# Patient Record
Sex: MALE | URBAN - METROPOLITAN AREA
[De-identification: names, ages, dates, MRNs, and addresses within clinical notes are randomized per-mention and may not be internally consistent; named-entity substitution may affect disease eponyms.]

---

## 2022-02-11 ENCOUNTER — TELEPHONE (OUTPATIENT)
Dept: PRIMARY CARE CLINIC | Age: 32
End: 2022-02-11

## 2022-02-11 NOTE — TELEPHONE ENCOUNTER
----- Message from Ángela Jessica sent at 2022  8:49 AM EST -----  Subject: Appointment Request    Reason for Call: New Patient Request Appointment    QUESTIONS  Type of Appointment? New Patient/New to Provider  Reason for appointment request? Other - Patient no in state. Additional Information for Provider? Patient would like to establish care   as new patient. Patient also thinks he may have shingles. Patient went to   urgent care earlier this week for headaches. Patient has developed rash   since then and swollen lymph nodes in neck. Patient would like a virtual   appointment but is currently in [address] [place] [address].   ---------------------------------------------------------------------------  --------------  CALL BACK INFO  What is the best way for the office to contact you? OK to leave message on   voicemail  Preferred Call Back Phone Number? [phone]  ---------------------------------------------------------------------------  --------------  SCRIPT ANSWERS  Relationship to Patient? Self  Specialty Confirmation? Primary Care  Is this the first appointment to establish care for a ? No  Have you been diagnosed with, awaiting test results for, or told that you   are suspected of having COVID-19 (Coronavirus)? (If patient has tested   negative or was tested as a requirement for work, school, or travel and   not based on symptoms, answer no)? No  Within the past two weeks have you developed any of the following symptoms   (answer no if symptoms have been present longer than 2 weeks or began   more than 2 weeks ago)? Fever or Chills, Cough, Shortness of breath or   difficulty breathing, Loss of taste or smell, Sore throat, Nasal   congestion, Sneezing or runny nose, Fatigue or generalized body aches   (answer no if pain is specific to a body part e.g. back pain), Diarrhea,   Headache?  Yes

## 2022-02-11 NOTE — TELEPHONE ENCOUNTER
Called and spoke to patient and informed him that at this time we are unable to schedule him for a VV, explained that he would need to be in the state of [place] for visit. Advised patient to go to Urgent Care to be evaluated for possible singles.

## 2024-04-28 ENCOUNTER — APPOINTMENT (OUTPATIENT)
Dept: GENERAL RADIOLOGY | Age: 34
End: 2024-04-28
Payer: COMMERCIAL

## 2024-04-28 ENCOUNTER — HOSPITAL ENCOUNTER (INPATIENT)
Age: 34
LOS: 3 days | Discharge: HOME | End: 2024-05-01
Attending: EMERGENCY MEDICINE | Admitting: INTERNAL MEDICINE
Payer: COMMERCIAL

## 2024-04-28 DIAGNOSIS — R55 SYNCOPE, UNSPECIFIED SYNCOPE TYPE: ICD-10-CM

## 2024-04-28 DIAGNOSIS — E86.0 DEHYDRATION: Primary | ICD-10-CM

## 2024-04-28 DIAGNOSIS — R55 SYNCOPE WITH NORMAL NEUROLOGIC EXAMINATION: ICD-10-CM

## 2024-04-28 DIAGNOSIS — N17.9 AKI (ACUTE KIDNEY INJURY) (HCC): ICD-10-CM

## 2024-04-28 LAB
ABSOLUTE BANDS: 1.09 K/UL (ref 0–1)
ANION GAP SERPL CALCULATED.3IONS-SCNC: 16 MMOL/L (ref 9–17)
BACTERIA URNS QL MICRO: ABNORMAL
BANDS: 5 % (ref 0–10)
BASOPHILS # BLD: 0 K/UL (ref 0–0.2)
BASOPHILS NFR BLD: 0 % (ref 0–2)
BILIRUB UR QL STRIP: NEGATIVE
BUN SERPL-MCNC: 20 MG/DL (ref 6–20)
CALCIUM SERPL-MCNC: 9 MG/DL (ref 8.6–10.4)
CASTS #/AREA URNS LPF: ABNORMAL /LPF
CHLORIDE SERPL-SCNC: 100 MMOL/L (ref 98–107)
CK SERPL-CCNC: 546 U/L (ref 39–308)
CLARITY UR: ABNORMAL
CO2 SERPL-SCNC: 23 MMOL/L (ref 20–31)
COLOR UR: YELLOW
CREAT SERPL-MCNC: 2.3 MG/DL (ref 0.7–1.2)
CREAT UR-MCNC: 118 MG/DL (ref 39–259)
EOSINOPHIL # BLD: 0 K/UL (ref 0–0.4)
EOSINOPHIL,URINE: NEGATIVE
EOSINOPHILS RELATIVE PERCENT: 0 % (ref 0–4)
EPI CELLS #/AREA URNS HPF: ABNORMAL /HPF
ERYTHROCYTE [DISTWIDTH] IN BLOOD BY AUTOMATED COUNT: 13.3 % (ref 11.5–14.9)
GFR SERPL CREATININE-BSD FRML MDRD: 37 ML/MIN/1.73M2
GLUCOSE SERPL-MCNC: 140 MG/DL (ref 70–99)
GLUCOSE UR STRIP-MCNC: NEGATIVE MG/DL
HCT VFR BLD AUTO: 44.4 % (ref 41–53)
HGB BLD-MCNC: 14.2 G/DL (ref 13.5–17.5)
HGB UR QL STRIP.AUTO: NEGATIVE
INR PPP: 1.1
KETONES UR STRIP-MCNC: ABNORMAL MG/DL
LEUKOCYTE ESTERASE UR QL STRIP: NEGATIVE
LYMPHOCYTES NFR BLD: 1.53 K/UL (ref 1–4.8)
LYMPHOCYTES RELATIVE PERCENT: 7 % (ref 24–44)
MAGNESIUM SERPL-MCNC: 1.9 MG/DL (ref 1.6–2.6)
MCH RBC QN AUTO: 30.1 PG (ref 26–34)
MCHC RBC AUTO-ENTMCNC: 32 G/DL (ref 31–37)
MCV RBC AUTO: 94.2 FL (ref 80–100)
MONOCYTES NFR BLD: 0.44 K/UL (ref 0.1–1.3)
MONOCYTES NFR BLD: 2 % (ref 1–7)
MORPHOLOGY: NORMAL
NEUTROPHILS NFR BLD: 86 % (ref 36–66)
NEUTS SEG NFR BLD: 18.74 K/UL (ref 1.3–9.1)
NITRITE UR QL STRIP: NEGATIVE
PARTIAL THROMBOPLASTIN TIME: 26.4 SEC (ref 24–36)
PH UR STRIP: 5 [PH] (ref 5–8)
PHOSPHATE SERPL-MCNC: 3 MG/DL (ref 2.5–4.5)
PLATELET # BLD AUTO: 173 K/UL (ref 150–450)
PMV BLD AUTO: 8.6 FL (ref 6–12)
POTASSIUM SERPL-SCNC: 4.7 MMOL/L (ref 3.7–5.3)
PROT UR STRIP-MCNC: ABNORMAL MG/DL
PROTHROMBIN TIME: 14.4 SEC (ref 11.8–14.6)
RBC # BLD AUTO: 4.71 M/UL (ref 4.5–5.9)
RBC #/AREA URNS HPF: ABNORMAL /HPF
SODIUM SERPL-SCNC: 139 MMOL/L (ref 135–144)
SODIUM UR-SCNC: 30 MMOL/L
SP GR UR STRIP: 1.02 (ref 1–1.03)
TROPONIN I SERPL HS-MCNC: 37 NG/L (ref 0–22)
TROPONIN I SERPL HS-MCNC: 72 NG/L (ref 0–22)
TROPONIN I SERPL HS-MCNC: 86 NG/L (ref 0–22)
UROBILINOGEN UR STRIP-ACNC: NORMAL EU/DL (ref 0–1)
UUN UR-MCNC: 665 MG/DL
WBC #/AREA URNS HPF: ABNORMAL /HPF
WBC OTHER # BLD: 21.8 K/UL (ref 3.5–11)

## 2024-04-28 PROCEDURE — 99285 EMERGENCY DEPT VISIT HI MDM: CPT

## 2024-04-28 PROCEDURE — 6360000002 HC RX W HCPCS: Performed by: INTERNAL MEDICINE

## 2024-04-28 PROCEDURE — 85610 PROTHROMBIN TIME: CPT

## 2024-04-28 PROCEDURE — 87086 URINE CULTURE/COLONY COUNT: CPT

## 2024-04-28 PROCEDURE — 84484 ASSAY OF TROPONIN QUANT: CPT

## 2024-04-28 PROCEDURE — 83735 ASSAY OF MAGNESIUM: CPT

## 2024-04-28 PROCEDURE — 84300 ASSAY OF URINE SODIUM: CPT

## 2024-04-28 PROCEDURE — 81001 URINALYSIS AUTO W/SCOPE: CPT

## 2024-04-28 PROCEDURE — 84540 ASSAY OF URINE/UREA-N: CPT

## 2024-04-28 PROCEDURE — 2580000003 HC RX 258: Performed by: INTERNAL MEDICINE

## 2024-04-28 PROCEDURE — 85730 THROMBOPLASTIN TIME PARTIAL: CPT

## 2024-04-28 PROCEDURE — 96360 HYDRATION IV INFUSION INIT: CPT

## 2024-04-28 PROCEDURE — 84100 ASSAY OF PHOSPHORUS: CPT

## 2024-04-28 PROCEDURE — 2580000003 HC RX 258: Performed by: EMERGENCY MEDICINE

## 2024-04-28 PROCEDURE — 80048 BASIC METABOLIC PNL TOTAL CA: CPT

## 2024-04-28 PROCEDURE — 96361 HYDRATE IV INFUSION ADD-ON: CPT

## 2024-04-28 PROCEDURE — 71045 X-RAY EXAM CHEST 1 VIEW: CPT

## 2024-04-28 PROCEDURE — 82550 ASSAY OF CK (CPK): CPT

## 2024-04-28 PROCEDURE — 2060000000 HC ICU INTERMEDIATE R&B

## 2024-04-28 PROCEDURE — 36415 COLL VENOUS BLD VENIPUNCTURE: CPT

## 2024-04-28 PROCEDURE — 93005 ELECTROCARDIOGRAM TRACING: CPT | Performed by: EMERGENCY MEDICINE

## 2024-04-28 PROCEDURE — 85025 COMPLETE CBC W/AUTO DIFF WBC: CPT

## 2024-04-28 PROCEDURE — 87205 SMEAR GRAM STAIN: CPT

## 2024-04-28 PROCEDURE — 82570 ASSAY OF URINE CREATININE: CPT

## 2024-04-28 RX ORDER — ONDANSETRON 2 MG/ML
4 INJECTION INTRAMUSCULAR; INTRAVENOUS EVERY 6 HOURS PRN
Status: DISCONTINUED | OUTPATIENT
Start: 2024-04-28 | End: 2024-05-01 | Stop reason: HOSPADM

## 2024-04-28 RX ORDER — ACETAMINOPHEN 650 MG/1
650 SUPPOSITORY RECTAL EVERY 6 HOURS PRN
Status: DISCONTINUED | OUTPATIENT
Start: 2024-04-28 | End: 2024-05-01 | Stop reason: HOSPADM

## 2024-04-28 RX ORDER — POLYETHYLENE GLYCOL 3350 17 G/17G
17 POWDER, FOR SOLUTION ORAL DAILY PRN
Status: DISCONTINUED | OUTPATIENT
Start: 2024-04-28 | End: 2024-05-01 | Stop reason: HOSPADM

## 2024-04-28 RX ORDER — SODIUM CHLORIDE 9 MG/ML
INJECTION, SOLUTION INTRAVENOUS CONTINUOUS
Status: DISCONTINUED | OUTPATIENT
Start: 2024-04-28 | End: 2024-04-30

## 2024-04-28 RX ORDER — ONDANSETRON 4 MG/1
4 TABLET, ORALLY DISINTEGRATING ORAL EVERY 8 HOURS PRN
Status: DISCONTINUED | OUTPATIENT
Start: 2024-04-28 | End: 2024-05-01 | Stop reason: HOSPADM

## 2024-04-28 RX ORDER — SODIUM CHLORIDE 9 MG/ML
INJECTION, SOLUTION INTRAVENOUS PRN
Status: DISCONTINUED | OUTPATIENT
Start: 2024-04-28 | End: 2024-05-01 | Stop reason: HOSPADM

## 2024-04-28 RX ORDER — POTASSIUM CHLORIDE 7.45 MG/ML
10 INJECTION INTRAVENOUS PRN
Status: DISCONTINUED | OUTPATIENT
Start: 2024-04-28 | End: 2024-05-01 | Stop reason: HOSPADM

## 2024-04-28 RX ORDER — LANOLIN ALCOHOL/MO/W.PET/CERES
3 CREAM (GRAM) TOPICAL NIGHTLY PRN
Status: DISCONTINUED | OUTPATIENT
Start: 2024-04-28 | End: 2024-05-01 | Stop reason: HOSPADM

## 2024-04-28 RX ORDER — SODIUM CHLORIDE 0.9 % (FLUSH) 0.9 %
5-40 SYRINGE (ML) INJECTION PRN
Status: DISCONTINUED | OUTPATIENT
Start: 2024-04-28 | End: 2024-05-01 | Stop reason: HOSPADM

## 2024-04-28 RX ORDER — 0.9 % SODIUM CHLORIDE 0.9 %
1000 INTRAVENOUS SOLUTION INTRAVENOUS ONCE
Status: COMPLETED | OUTPATIENT
Start: 2024-04-28 | End: 2024-04-28

## 2024-04-28 RX ORDER — ACETAMINOPHEN 325 MG/1
650 TABLET ORAL EVERY 6 HOURS PRN
Status: DISCONTINUED | OUTPATIENT
Start: 2024-04-28 | End: 2024-05-01 | Stop reason: HOSPADM

## 2024-04-28 RX ORDER — POTASSIUM CHLORIDE 20 MEQ/1
40 TABLET, EXTENDED RELEASE ORAL PRN
Status: DISCONTINUED | OUTPATIENT
Start: 2024-04-28 | End: 2024-05-01 | Stop reason: HOSPADM

## 2024-04-28 RX ORDER — SODIUM CHLORIDE 0.9 % (FLUSH) 0.9 %
5-40 SYRINGE (ML) INJECTION EVERY 12 HOURS SCHEDULED
Status: DISCONTINUED | OUTPATIENT
Start: 2024-04-28 | End: 2024-05-01 | Stop reason: HOSPADM

## 2024-04-28 RX ORDER — PHENOL 1.4 %
5 AEROSOL, SPRAY (ML) MUCOUS MEMBRANE NIGHTLY PRN
COMMUNITY

## 2024-04-28 RX ORDER — MAGNESIUM SULFATE HEPTAHYDRATE 40 MG/ML
2000 INJECTION, SOLUTION INTRAVENOUS PRN
Status: DISCONTINUED | OUTPATIENT
Start: 2024-04-28 | End: 2024-05-01 | Stop reason: HOSPADM

## 2024-04-28 RX ORDER — ENOXAPARIN SODIUM 100 MG/ML
40 INJECTION SUBCUTANEOUS DAILY
Status: DISCONTINUED | OUTPATIENT
Start: 2024-04-28 | End: 2024-05-01 | Stop reason: HOSPADM

## 2024-04-28 RX ADMIN — SODIUM CHLORIDE 1000 ML: 9 INJECTION, SOLUTION INTRAVENOUS at 14:39

## 2024-04-28 RX ADMIN — SODIUM CHLORIDE 1000 ML: 9 INJECTION, SOLUTION INTRAVENOUS at 13:41

## 2024-04-28 RX ADMIN — CEFTRIAXONE SODIUM 1000 MG: 1 INJECTION, POWDER, FOR SOLUTION INTRAMUSCULAR; INTRAVENOUS at 19:55

## 2024-04-28 RX ADMIN — SODIUM CHLORIDE: 9 INJECTION, SOLUTION INTRAVENOUS at 16:01

## 2024-04-28 ASSESSMENT — HEART SCORE: ECG: NORMAL

## 2024-04-28 ASSESSMENT — PAIN - FUNCTIONAL ASSESSMENT: PAIN_FUNCTIONAL_ASSESSMENT: NONE - DENIES PAIN

## 2024-04-28 NOTE — H&P
[facility]  IN-PATIENT SERVICE  [facility]  IN-PATIENT SERVICE   [facility]     HISTORY AND PHYSICAL EXAMINATION            Date:   4/28/2024  Patient name:  Irwin Elena  Date of admission:  4/28/2024  1:29 PM  MRN:   [id number]  Account:  [id number]  YOB: 1990  PCP:    No primary care provider on file.  Room:   08/08  Code Status:    No Order    Chief Complaint:     Chief Complaint   Patient presents with    Loss of Consciousness       History Obtained From:     patient    History of Present Illness:     Irwin Elena is a 33 y.o. male without significant past medical history who presented after syncopal episode at home.  Patient was participating in [place] this morning, and after finishing, the patient experienced blurry vision, ears ringing, and had an episode of emesis.  About 1 hour after finishing, the patient was at home speaking with his sister when he slumped over and lost consciousness for approximately 15 seconds.  Patient regained consciousness without confusion.  There is no witnessed shaking, tongue bite, trauma, or incontinence.  Patient had associated muscle cramps with running, and 2-day history of diarrhea. Patient denies any cough, congestion, headache, runny nose, or other signs of infection besides diarrhea and abdominal cramps. In the ED, EKG showed ST elevations in V2 to V5.  Troponin elevated 86, repeat 72.  Cardiology was consulted and did not recommend any acute interventions at this time.  Lab workup also notable for leukocytosis of 21.8, and EDEL with Cr of 2.3, baseline normal.       Past Medical History:     History reviewed. No pertinent past medical history.     Past SurgicalHistory:     History reviewed. No pertinent surgical history.     Medications Prior to Admission:     Prior to Admission medications    Not on File        Allergies:     Patient has          Investigations:     Laboratory Testing:  Recent Results (from the past 24 hour(s))   Basic Metabolic Panel    Collection Time: 04/28/24  2:15 PM   Result Value Ref Range    Sodium 139 135 - 144 mmol/L    Potassium 4.7 3.7 - 5.3 mmol/L    Chloride 100 98 - 107 mmol/L    CO2 23 20 - 31 mmol/L    Anion Gap 16 9 - 17 mmol/L    Glucose 140 (H) 70 - 99 mg/dL    BUN 20 6 - 20 mg/dL    Creatinine 2.3 (H) 0.7 - 1.2 mg/dL    Est, Glom Filt Rate 37 (L) >60 mL/min/1.73m2    Calcium 9.0 8.6 - 10.4 mg/dL   CBC with Auto Differential    Collection Time: 04/28/24  2:15 PM   Result Value Ref Range    WBC 21.8 (H) 3.5 - 11.0 k/uL    RBC 4.71 4.5 - 5.9 m/uL    Hemoglobin 14.2 13.5 - 17.5 g/dL    Hematocrit 44.4 41 - 53 %    MCV 94.2 80 - 100 fL    MCH 30.1 26 - 34 pg    MCHC 32.0 31 - 37 g/dL    RDW 13.3 11.5 - 14.9 %    Platelets 173 150 - 450 k/uL    MPV 8.6 6.0 - 12.0 fL    Neutrophils % 86 (H) 36 - 66 %    Lymphocytes % 7 (L) 24 - 44 %    Monocytes % 2 1 - 7 %    Eosinophils % 0 0 - 4 %    Basophils % 0 0 - 2 %    Bands 5 0 - 10 %    Neutrophils Absolute 18.74 (H) 1.3 - 9.1 k/uL    Lymphocytes Absolute 1.53 1.0 - 4.8 k/uL    Monocytes Absolute 0.44 0.1 - 1.3 k/uL    Eosinophils Absolute 0.00 0.0 - 0.4 k/uL    Basophils Absolute 0.00 0.0 - 0.2 k/uL    Absolute Bands 1.09 (H) 0.0 - 1.0 k/uL    Morphology Normal    CK    Collection Time: 04/28/24  2:15 PM   Result Value Ref Range    Total  (H) 39 - 308 U/L   Magnesium    Collection Time: 04/28/24  2:15 PM   Result Value Ref Range    Magnesium 1.9 1.6 - 2.6 mg/dL   Phosphorus    Collection Time: 04/28/24  2:15 PM   Result Value Ref Range    Phosphorus 3.0 2.5 - 4.5 mg/dL   Protime-INR    Collection Time: 04/28/24  2:15 PM   Result Value Ref Range    Protime 14.4 11.8 - 14.6 sec    INR 1.1    APTT    Collection Time: 04/28/24  2:15 PM   Result Value Ref Range    APTT 26.4 24.0 - 36.0 sec   Troponin    Collection Time: 04/28/24  2:15 PM   Result Value Ref Range

## 2024-04-28 NOTE — ED TRIAGE NOTES
Mode of arrival (squad #, walk in, police, etc) : Medic 41        Chief complaint(s): Loss of Consciousness        Arrival Note (brief scenario, treatment PTA, etc).: pt ran [place] today and felt woozy at end of race. Pt got home and passed out. Pt has never has LOC, pt did not hit head. Pt reports he thought he was adequately hydrating. Pt receiving IV fluids from EMS upon arrival        C= \"Have you ever felt that you should Cut down on your drinking?\"  No  A= \"Have people Annoyed you by criticizing your drinking?\"  No  G= \"Have you ever felt bad or Guilty about your drinking?\"  No  E= \"Have you ever had a drink as an Eye-opener first thing in the morning to steady your nerves or to help a hangover?\"  No      Deferred []      Reason for deferring: N/A    *If yes to two or more: probable alcohol abuse.*

## 2024-04-28 NOTE — ED PROVIDER NOTES
EMERGENCY DEPARTMENT ENCOUNTER    Pt Name: Irwin Elena  MRN: [id number]  Birthdate 1990  Date of evaluation: 4/28/24  CHIEF COMPLAINT       Chief Complaint   Patient presents with    Loss of Consciousness     HISTORY OF PRESENT ILLNESS   33-year-old male presenting to the ER complaining of a syncopal event.  Patient ran a 26.2 mile marathon and felt a little weak afterwards but went home and then had a syncopal event.  The patient feels fatigued currently but no other symptoms    The history is provided by the patient.   Loss of Consciousness  Episode history:  Single  Most recent episode:  Today  Associated symptoms: no chest pain, no dizziness, no fever, no nausea, no palpitations, no shortness of breath and no vomiting            REVIEW OF SYSTEMS     Review of Systems   Constitutional:  Positive for fatigue. Negative for activity change and fever.   HENT:  Negative for congestion, ear pain and trouble swallowing.    Eyes:  Negative for photophobia and visual disturbance.   Respiratory:  Negative for cough and shortness of breath.    Cardiovascular:  Positive for syncope. Negative for chest pain and palpitations.   Gastrointestinal:  Negative for abdominal pain, diarrhea, nausea and vomiting.   Genitourinary:  Negative for dysuria, flank pain and urgency.   Musculoskeletal:  Negative for arthralgias and myalgias.   Skin:  Negative for color change and rash.   Neurological:  Positive for syncope. Negative for dizziness and facial asymmetry.   Psychiatric/Behavioral:  Negative for agitation and behavioral problems.      PASTMEDICAL HISTORY   History reviewed. No pertinent past medical history.  Past Problem List  Patient Active Problem List   Diagnosis Code    Syncope with normal neurologic examination R55     SURGICAL HISTORY     History reviewed. No pertinent surgical history.  CURRENT MEDICATIONS       Current Discharge Medication List        CONTINUE these medications which have NOT CHANGED    Details

## 2024-04-28 NOTE — ED NOTES
Report given to TOMMY Carranza from U.   Report method by phone   The following was reviewed with receiving RN:   Current vital signs:  /76   Pulse 82   Temp 97.8 °F (36.6 °C) (Oral)   Resp 12   Ht 1.778 m (5' 10\")   Wt 79.4 kg (175 lb)   SpO2 99%   BMI 25.11 kg/m²                MEWS Score: 0     Any medication or safety alerts were reviewed. Any pending diagnostics and notifications were also reviewed, as well as any safety concerns or issues, abnormal labs, abnormal imaging, and abnormal assessment findings. Questions were answered.

## 2024-04-29 ENCOUNTER — APPOINTMENT (OUTPATIENT)
Dept: ULTRASOUND IMAGING | Age: 34
End: 2024-04-29
Payer: COMMERCIAL

## 2024-04-29 ENCOUNTER — HOSPITAL ENCOUNTER (INPATIENT)
Age: 34
Discharge: HOME | End: 2024-05-01
Attending: INTERNAL MEDICINE
Payer: COMMERCIAL

## 2024-04-29 PROBLEM — E86.0 DEHYDRATION: Status: ACTIVE | Noted: 2024-04-29

## 2024-04-29 PROBLEM — R79.89 TROPONIN LEVEL ELEVATED: Status: ACTIVE | Noted: 2024-04-29

## 2024-04-29 LAB
ANION GAP SERPL CALCULATED.3IONS-SCNC: 8 MMOL/L (ref 9–17)
BASOPHILS # BLD: 0 K/UL (ref 0–0.2)
BASOPHILS NFR BLD: 0 % (ref 0–2)
BUN SERPL-MCNC: 14 MG/DL (ref 6–20)
CALCIUM SERPL-MCNC: 8.2 MG/DL (ref 8.6–10.4)
CHLORIDE SERPL-SCNC: 109 MMOL/L (ref 98–107)
CHLORIDE UR-SCNC: 171 MMOL/L
CK SERPL-CCNC: 3770 U/L (ref 39–308)
CO2 SERPL-SCNC: 23 MMOL/L (ref 20–31)
CREAT SERPL-MCNC: 1.1 MG/DL (ref 0.7–1.2)
ECHO AO ROOT DIAM: 3.5 CM
ECHO AO ROOT INDEX: 1.78 CM/M2
ECHO AV AREA PEAK VELOCITY: 3.4 CM2
ECHO AV AREA VTI: 3 CM2
ECHO AV AREA/BSA PEAK VELOCITY: 1.7 CM2/M2
ECHO AV AREA/BSA VTI: 1.5 CM2/M2
ECHO AV MEAN GRADIENT: 5 MMHG
ECHO AV MEAN VELOCITY: 1.1 M/S
ECHO AV PEAK GRADIENT: 8 MMHG
ECHO AV PEAK VELOCITY: 1.4 M/S
ECHO AV VELOCITY RATIO: 1
ECHO AV VTI: 30.3 CM
ECHO BSA: 1.98 M2
ECHO LA AREA 2C: 19.4 CM2
ECHO LA AREA 4C: 16.7 CM2
ECHO LA DIAMETER INDEX: 1.47 CM/M2
ECHO LA DIAMETER: 2.9 CM
ECHO LA MAJOR AXIS: 5.1 CM
ECHO LA MINOR AXIS: 4.3 CM
ECHO LA TO AORTIC ROOT RATIO: 0.83
ECHO LA VOL BP: 60 ML (ref 18–58)
ECHO LA VOL MOD A2C: 68 ML (ref 18–58)
ECHO LA VOL MOD A4C: 44 ML (ref 18–58)
ECHO LA VOL/BSA BIPLANE: 30 ML/M2 (ref 16–34)
ECHO LA VOLUME INDEX MOD A2C: 35 ML/M2 (ref 16–34)
ECHO LA VOLUME INDEX MOD A4C: 22 ML/M2 (ref 16–34)
ECHO LV FRACTIONAL SHORTENING: 28 % (ref 28–44)
ECHO LV INTERNAL DIMENSION DIASTOLE INDEX: 2.54 CM/M2
ECHO LV INTERNAL DIMENSION DIASTOLIC: 5 CM (ref 4.2–5.9)
ECHO LV INTERNAL DIMENSION SYSTOLIC INDEX: 1.83 CM/M2
ECHO LV INTERNAL DIMENSION SYSTOLIC: 3.6 CM
ECHO LV IVSD: 0.8 CM (ref 0.6–1)
ECHO LV MASS 2D: 146.8 G (ref 88–224)
ECHO LV MASS INDEX 2D: 74.5 G/M2 (ref 49–115)
ECHO LV POSTERIOR WALL DIASTOLIC: 0.9 CM (ref 0.6–1)
ECHO LV RELATIVE WALL THICKNESS RATIO: 0.36
ECHO LVOT AREA: 3.5 CM2
ECHO LVOT AV VTI INDEX: 0.87
ECHO LVOT DIAM: 2.1 CM
ECHO LVOT MEAN GRADIENT: 3 MMHG
ECHO LVOT PEAK GRADIENT: 8 MMHG
ECHO LVOT PEAK VELOCITY: 1.4 M/S
ECHO LVOT STROKE VOLUME INDEX: 46.6 ML/M2
ECHO LVOT SV: 91.7 ML
ECHO LVOT VTI: 26.5 CM
ECHO MV A VELOCITY: 0.65 M/S
ECHO MV E DECELERATION TIME (DT): 155 MS
ECHO MV E VELOCITY: 0.9 M/S
ECHO MV E/A RATIO: 1.38
ECHO RA AREA 4C: 24.8 CM2
ECHO RA END SYSTOLIC VOLUME APICAL 4 CHAMBER INDEX BSA: 42 ML/M2
ECHO RA VOLUME: 83 ML
ECHO RV TAPSE: 2.8 CM (ref 1.7–?)
EOSINOPHIL # BLD: 0.16 K/UL (ref 0–0.4)
EOSINOPHILS RELATIVE PERCENT: 1 % (ref 0–4)
ERYTHROCYTE [DISTWIDTH] IN BLOOD BY AUTOMATED COUNT: 13.3 % (ref 11.5–14.9)
GFR SERPL CREATININE-BSD FRML MDRD: 90 ML/MIN/1.73M2
GLUCOSE SERPL-MCNC: 103 MG/DL (ref 70–99)
HCT VFR BLD AUTO: 39.2 % (ref 41–53)
HGB BLD-MCNC: 13.1 G/DL (ref 13.5–17.5)
LYMPHOCYTES NFR BLD: 2.74 K/UL (ref 1–4.8)
LYMPHOCYTES RELATIVE PERCENT: 17 % (ref 24–44)
MCH RBC QN AUTO: 31.7 PG (ref 26–34)
MCHC RBC AUTO-ENTMCNC: 33.3 G/DL (ref 31–37)
MCV RBC AUTO: 95.2 FL (ref 80–100)
MONOCYTES NFR BLD: 0.81 K/UL (ref 0.1–1.3)
MONOCYTES NFR BLD: 5 % (ref 1–7)
MORPHOLOGY: NORMAL
NEUTROPHILS NFR BLD: 77 % (ref 36–66)
NEUTS SEG NFR BLD: 12.39 K/UL (ref 1.3–9.1)
PLATELET # BLD AUTO: 152 K/UL (ref 150–450)
PMV BLD AUTO: 9.1 FL (ref 6–12)
POTASSIUM SERPL-SCNC: 5 MMOL/L (ref 3.7–5.3)
RBC # BLD AUTO: 4.12 M/UL (ref 4.5–5.9)
SODIUM SERPL-SCNC: 140 MMOL/L (ref 135–144)
SODIUM UR-SCNC: 157 MMOL/L
TOTAL PROTEIN, URINE: 9 MG/DL
WBC OTHER # BLD: 16.1 K/UL (ref 3.5–11)

## 2024-04-29 PROCEDURE — 99223 1ST HOSP IP/OBS HIGH 75: CPT | Performed by: INTERNAL MEDICINE

## 2024-04-29 PROCEDURE — 36415 COLL VENOUS BLD VENIPUNCTURE: CPT

## 2024-04-29 PROCEDURE — 84156 ASSAY OF PROTEIN URINE: CPT

## 2024-04-29 PROCEDURE — 51798 US URINE CAPACITY MEASURE: CPT

## 2024-04-29 PROCEDURE — 82550 ASSAY OF CK (CPK): CPT

## 2024-04-29 PROCEDURE — 82436 ASSAY OF URINE CHLORIDE: CPT

## 2024-04-29 PROCEDURE — 86225 DNA ANTIBODY NATIVE: CPT

## 2024-04-29 PROCEDURE — 76775 US EXAM ABDO BACK WALL LIM: CPT

## 2024-04-29 PROCEDURE — 84300 ASSAY OF URINE SODIUM: CPT

## 2024-04-29 PROCEDURE — 2580000003 HC RX 258: Performed by: INTERNAL MEDICINE

## 2024-04-29 PROCEDURE — 93005 ELECTROCARDIOGRAM TRACING: CPT

## 2024-04-29 PROCEDURE — 2060000000 HC ICU INTERMEDIATE R&B

## 2024-04-29 PROCEDURE — 85025 COMPLETE CBC W/AUTO DIFF WBC: CPT

## 2024-04-29 PROCEDURE — 93306 TTE W/DOPPLER COMPLETE: CPT

## 2024-04-29 PROCEDURE — 80048 BASIC METABOLIC PNL TOTAL CA: CPT

## 2024-04-29 PROCEDURE — 93306 TTE W/DOPPLER COMPLETE: CPT | Performed by: INTERNAL MEDICINE

## 2024-04-29 PROCEDURE — 6360000002 HC RX W HCPCS

## 2024-04-29 PROCEDURE — 86038 ANTINUCLEAR ANTIBODIES: CPT

## 2024-04-29 RX ADMIN — SODIUM CHLORIDE: 9 INJECTION, SOLUTION INTRAVENOUS at 23:38

## 2024-04-29 RX ADMIN — ENOXAPARIN SODIUM 40 MG: 100 INJECTION SUBCUTANEOUS at 08:01

## 2024-04-29 RX ADMIN — SODIUM CHLORIDE: 9 INJECTION, SOLUTION INTRAVENOUS at 15:58

## 2024-04-29 ASSESSMENT — PAIN SCALES - GENERAL: PAINLEVEL_OUTOF10: 2

## 2024-04-29 ASSESSMENT — PAIN - FUNCTIONAL ASSESSMENT: PAIN_FUNCTIONAL_ASSESSMENT: ACTIVITIES ARE NOT PREVENTED

## 2024-04-29 ASSESSMENT — PAIN DESCRIPTION - PAIN TYPE: TYPE: ACUTE PAIN

## 2024-04-29 ASSESSMENT — PAIN DESCRIPTION - LOCATION: LOCATION: GENERALIZED

## 2024-04-29 ASSESSMENT — PAIN DESCRIPTION - DESCRIPTORS: DESCRIPTORS: SORE

## 2024-04-29 NOTE — CONSULTS
Department of Internal Medicine  Nephrology Cherelle Martinez MD   Consult Note    Reason for consultation: Management of acute kidney injury.    Consulting physician: Taryn Velaqzuez MD.    History of present illness: This is a 33 y.o. male with no significant past medical history, who presented after episode of syncope which occurred after he finished a 26.2 mile marathon.  There was no associated weakness orseizures.  He is not on any medications. He apparently had diarrhea few days prior to the marathon. He however stayed hydrated and was drinking Gatorade.    Vital signs were stable at presentation with blood pressure 130/64 mmHg.  Chest x-ray was clear.  Laboratory studies were remarkable for elevated serum creatinine 2.3 mg/dL and  and hence nephrology consultation.  CPK level has jumped to 3770 today.  He is nonoliguric.    Patient has no known allergies.    History reviewed. No pertinent past medical history.    Scheduled Meds:   sodium chloride flush  5-40 mL IntraVENous 2 times per day    enoxaparin  40 mg SubCUTAneous Daily     Continuous Infusions:   sodium chloride 150 mL/hr at 04/28/24 1955    sodium chloride       PRN Meds:.sodium chloride flush, sodium chloride, potassium chloride **OR** potassium alternative oral replacement **OR** potassium chloride, magnesium sulfate, ondansetron **OR** ondansetron, melatonin, polyethylene glycol, acetaminophen **OR** acetaminophen    History reviewed. No pertinent family history.     Social History     Socioeconomic History    Marital status: Single     Spouse name: None    Number of children: None    Years of education: None    Highest education level: None   Tobacco Use    Smoking status: Never   Substance and Sexual Activity    Alcohol use: Yes     Comment: weekly    Drug use: Never     Social Determinants of Health     Food Insecurity: No Food Insecurity (4/28/2024)    Hunger Vital Sign     Worried About Running Out of Food in the Last Year:

## 2024-04-29 NOTE — DISCHARGE INSTR - COC
Continuity of Care Form    Patient Name: Irwin Elena   :  1990  MRN:  [id number]    Admit date:  2024  Discharge date:  ***    Code Status Order: Full Code   Advance Directives:     Admitting Physician:  Taryn Velazquez MD  PCP: No primary care provider on file.    Discharging Nurse: ***  Discharging Hospital Unit/Room#: 2083/2083-01  Discharging Unit Phone Number: ***    Emergency Contact:   No emergency contact information on file.    Past Surgical History:  History reviewed. No pertinent surgical history.    Immunization History:     There is no immunization history on file for this patient.    Active Problems:  Patient Active Problem List   Diagnosis Code    Syncope with normal neurologic examination R55       Isolation/Infection:   Isolation            No Isolation          Patient Infection Status       None to display            Nurse Assessment:  Last Vital Signs: /78   Pulse 70   Temp 97.5 °F (36.4 °C) (Oral)   Resp 18   Ht 1.778 m (5' 10\")   Wt 79.4 kg (175 lb)   SpO2 99%   BMI 25.11 kg/m²     Last documented pain score (0-10 scale): Pain Level: 2  Last Weight:   Wt Readings from Last 1 Encounters:   24 79.4 kg (175 lb)     Mental Status:  {IP PT MENTAL STATUS:}    IV Access:  { HILDA IV ACCESS:073296258}    Nursing Mobility/ADLs:  Walking   {CHP DME ADLs:999944938}  Transfer  {CHP DME ADLs:396306102}  Bathing  {CHP DME ADLs:075019299}  Dressing  {CHP DME ADLs:912395777}  Toileting  {CHP DME ADLs:350516234}  Feeding  {CHP DME ADLs:709824808}  Med Admin  {CHP DME ADLs:181081050}  Med Delivery   { HILDA MED Delivery:484473727}    Wound Care Documentation and Therapy:        Elimination:  Continence:   Bowel: {YES / NO:}  Bladder: {YES / NO:}  Urinary Catheter: {Urinary Catheter:044374191}   Colostomy/Ileostomy/Ileal Conduit: {YES / NO:}       Date of Last BM: ***  No intake or output data in the 24 hours ending 24 1602  No intake/output data  30 days.     Update Admission H&P: {CHP DME Changes in HandP:390646372}    PHYSICIAN SIGNATURE:  {Esignature:604319111}

## 2024-04-29 NOTE — PROGRESS NOTES
[facility]   OCCUPATIONAL THERAPY MISSED TREATMENT NOTE   INPATIENT   Date: 24  Patient Name: Irwin Elena       Room:   MRN: [id number]   Account #: [id number]    : 1990  (33 y.o.)  Gender: male                 REASON FOR MISSED TREATMENT:  Patient screened for occupational therapy this date    -   Writer introduced self and role of OT. Patient is independent in-room with self-care and functional mobility. OT being discontinued with no acute OT needs. Case discussed with TOMMY Yarbrough. 0901    Electronically signed by GARY Kerr on 24 at 9:25 AM EDT

## 2024-04-29 NOTE — CONSULTS
[facility]   Consult Note                 Date:   4/29/2024  Date of admission:  4/28/2024  1:29 PM  MRN:   [id number]  YOB: 1990    Reason for Consult:   syncope    HISTORY OF PRESENT ILLNESS:    The patient is a 33 y.o.  male who is admitted to the hospital for loss of consciousness.    According patient he then first-time marathon although he did has been practicing for marathon.    According to had a history of some diarrhea abdominal discomfort  According to the last 1 or 2 mi patient was very difficult heavy legs.    Patient finish the marathon went home according to has been drinking Gatorade patient is a loss of consciousness   Denies any prodromal symptoms of heart fluttering racing skipping   Patient 1st tropes 86- 72- 37  - 3770   EKG shows some early repolarization changes patient creatinine was 2.3 on arrival this morning is 1.1  Denies any prior history of syncope any cardiac problem hypertension diabetes DVT patient is nonsmoker    Past Medical History:   has no past medical history on file.    Past Surgical History:   has no past surgical history on file.     Home Medications:    Prior to Admission medications    Medication Sig Start Date End Date Taking? Authorizing Provider   Melatonin 10 MG TABS Take 5 mg by mouth nightly as needed (takes about once a month)   Yes Provider, MD Chinmay       Current Medications: Scheduled Meds:   sodium chloride flush  5-40 mL IntraVENous 2 times per day    enoxaparin  40 mg SubCUTAneous Daily    cefTRIAXone (ROCEPHIN) IV  1,000 mg IntraVENous Q24H     Continuous Infusions:   sodium chloride 150 mL/hr at 04/28/24 1955    sodium chloride       PRN Meds:.sodium chloride flush, sodium chloride, potassium chloride **OR** potassium alternative oral replacement **OR** potassium chloride, magnesium sulfate, ondansetron **OR** ondansetron, melatonin, polyethylene glycol, acetaminophen **OR** acetaminophen     Allergies:

## 2024-04-29 NOTE — DISCHARGE INSTR - DIET
Good nutrition is important when healing from an illness, injury, or surgery.  Follow any nutrition recommendations given to you during your hospital stay.   If you were given an oral nutrition supplement while in the hospital, continue to take this supplement at home.  You can take it with meals, in-between meals, and/or before bedtime. These supplements can be purchased at most local grocery stores, pharmacies, and chain [employer]-stores.   If you have any questions about your diet or nutrition, call the hospital and ask for the dietitian.    Healthy balanced nutrition

## 2024-04-29 NOTE — PROGRESS NOTES
[facility]  IN-PATIENT SERVICE  [facility]    PROGRESS NOTE             4/29/2024    7:55 AM    Name:   Irwin Elena  MRN:     [id number]     Acct:      [id number]   Room:   2083/2083-01   Day:  1  Admit Date:  4/28/2024  1:29 PM    PCP:  No primary care provider on file.  Code Status:  Full Code    Subjective:     C/C:   Chief Complaint   Patient presents with    Loss of Consciousness     Interval History Status: significantly improved.    Patient seen and examined at bedside this morning.  He was resting comfortably in bed.  States he feels much better than when he came in.  Denies chest pain, shortness of breath, flank pain, dysuria, or cough.    Nephro following.  CK up to 3000 this morning from 500.  Likely continue to monitor overnight for improvement of CK.    Kidney function back to near baseline.    Brief History:     Irwin Elena is a 33 y.o. male without significant past medical history who presented after syncopal episode at home.  Patient was participating in [employer] this morning, and after finishing, the patient experienced blurry vision, ears ringing, and had an episode of emesis.  About 1 hour after finishing, the patient was at home speaking with his sister when he slumped over and lost consciousness for approximately 15 seconds.  Patient regained consciousness without confusion.  There is no witnessed shaking, tongue bite, trauma, or incontinence.  Patient had associated muscle cramps with running, and 2-day history of diarrhea. Patient denies any cough, congestion, headache, runny nose, or other signs of infection besides diarrhea and abdominal cramps. In the ED, EKG showed ST elevations in V2 to V5.  Troponin elevated 86, repeat 72.  Cardiology was consulted and did not recommend any acute interventions at this time.  Lab workup also notable for leukocytosis of 21.8, and EDEL with Cr of 2.3, baseline normal.     Review of Systems:      Review of Systems    Medications:     Allergies:  No Known Allergies    Current Meds:   Scheduled Meds:    sodium chloride flush  5-40 mL IntraVENous 2 times per day    enoxaparin  40 mg SubCUTAneous Daily    cefTRIAXone (ROCEPHIN) IV  1,000 mg IntraVENous Q24H     Continuous Infusions:    sodium chloride 150 mL/hr at 24    sodium chloride       PRN Meds: sodium chloride flush, sodium chloride, potassium chloride **OR** potassium alternative oral replacement **OR** potassium chloride, magnesium sulfate, ondansetron **OR** ondansetron, melatonin, polyethylene glycol, acetaminophen **OR** acetaminophen    Data:     Past Medical History:   has no past medical history on file.    Social History:   reports that he has never smoked. He does not have any smokeless tobacco history on file. He reports current alcohol use. He reports that he does not use drugs.     Family History: History reviewed. No pertinent family history.    Vitals:  /74   Pulse 66   Temp 97.5 °F (36.4 °C) (Oral)   Resp 18   Ht 1.778 m (5' 10\")   Wt 79.4 kg (175 lb)   SpO2 100%   BMI 25.11 kg/m²   Temp (24hrs), Av.2 °F (36.8 °C), Min:97.5 °F (36.4 °C), Max:99.2 °F (37.3 °C)    No results for input(s): \"POCGLU\" in the last 72 hours.    I/O(24Hr):  No intake or output data in the 24 hours ending 24 0755    Labs:  [unfilled]    No results found for: \"SPECIAL\"  No results found for: \"CULTURE\"    [unfilled]    Radiology:    XR CHEST PORTABLE    Result Date: 2024  EXAMINATION: ONE XRAY VIEW OF THE CHEST 2024 12:35 pm COMPARISON: None. HISTORY: ORDERING SYSTEM PROVIDED HISTORY: syncope TECHNOLOGIST PROVIDED HISTORY: syncope Reason for Exam: Pt states he was running a marathon today and his leg started feeling weird and he passed out after the race. No chest complaints FINDINGS: The lungs and costophrenic angles are clear.  The cardiomediastinal silhouette, pulmonary vessels and interstitium appear normal.     No

## 2024-04-29 NOTE — CARE COORDINATION
Case Management Assessment  Initial Evaluation    Date/Time of Evaluation: 4/29/2024 9:29 AM  Assessment Completed by: Carleen Schumacher RN    If patient is discharged prior to next notation, then this note serves as note for discharge by case management.    Patient Name: Irwin Elena                   YOB: 1990  Diagnosis: Dehydration [E86.0]  EDEL (acute kidney injury) (HCC) [N17.9]  Syncope with normal neurologic examination [R55]                   Date / Time: 4/28/2024  1:29 PM    Patient Admission Status: Inpatient   Readmission Risk (Low < 19, Mod (19-27), High > 27): Readmission Risk Score: 4.2    Current PCP: No primary care provider on file.  PCP verified by CM? Yes    Chart Reviewed: Yes      History Provided by: Patient  Patient Orientation: Alert and Oriented    Patient Cognition: Alert    Hospitalization in the last 30 days (Readmission):  No    If yes, Readmission Assessment in CM Navigator will be completed.    Advance Directives:      Code Status: Full Code   Patient's Primary Decision Maker is:        Discharge Planning:    Patient lives with: Alone Type of Home: House  Primary Care Giver: Self  Patient Support Systems include: Parent   Current Financial resources: None  Current community resources: None  Current services prior to admission: None            Current DME:              Type of Home Care services:  None    ADLS  Prior functional level: Independent in ADLs/IADLs  Current functional level: Independent in ADLs/IADLs    PT AM-PAC:   /24  OT AM-PAC:   /24    Family can provide assistance at DC: Yes  Would you like Case Management to discuss the discharge plan with any other family members/significant others, and if so, who? No  Plans to Return to Present Housing: Yes  Other Identified Issues/Barriers to RETURNING to current housing: no  Potential Assistance needed at discharge: N/A            Potential DME:  n/a  Patient expects to discharge to: House  Plan for

## 2024-04-29 NOTE — PLAN OF CARE
Problem: Discharge Planning  Goal: Discharge to home or other facility with appropriate resources  4/29/2024 1345 by Linnea Espinoza, RN  Outcome: Progressing  Dc barrier: echo, renal US results and labs ordered in the morning to trend CR    Problem: Safety - Adult  Goal: Free from fall injury  4/29/2024 1345 by Linnea Espinoza, RN  Outcome: Progressing  Pt assessed as a fall risk this shift. Remains free from falls and accidental injury at this time. Fall precautions in place, including falling star sign and fall risk band on pt. Floor free from obstacles, and bed is locked and in lowest position. Adequate lighting provided.  Pt encouraged to call before getting OOB for any need.  Bed alarm activated. Will continue to monitor needs during hourly rounding, and reinforce education on use of call light.     Problem: Pain  Goal: Verbalizes/displays adequate comfort level or baseline comfort level  4/29/2024 1345 by Linnea Espinoza, RN  Outcome: Progressing   Pt medicated with pain medication prn.  Assessed all pain characteristics including level, type, location, frequency, and onset.  Non-pharmacologic interventions offered to pt as well.  Pt states pain is tolerable at this time.

## 2024-04-29 NOTE — PROGRESS NOTES
Physical Therapy        Physical Therapy Cancel Note      DATE: 2024    NAME: Irwin Elena  MRN: [id number]   : 1990      Patient not seen this date for Physical Therapy due to:    Patient at baseline functional level with no acute PT needs. Will defer PT evaluation at this time. Please reorder PT if future needs arise.       Electronically signed by Genesis Parkinson, PT on 2024 at 12:17 PM

## 2024-04-30 LAB
ANA SER QL IA: NEGATIVE
ANION GAP SERPL CALCULATED.3IONS-SCNC: 7 MMOL/L (ref 9–17)
BASOPHILS # BLD: 0 K/UL (ref 0–0.2)
BASOPHILS NFR BLD: 1 % (ref 0–2)
BUN SERPL-MCNC: 10 MG/DL (ref 6–20)
CALCIUM SERPL-MCNC: 8.9 MG/DL (ref 8.6–10.4)
CHLORIDE SERPL-SCNC: 110 MMOL/L (ref 98–107)
CK SERPL-CCNC: 4038 U/L (ref 39–308)
CO2 SERPL-SCNC: 24 MMOL/L (ref 20–31)
CREAT SERPL-MCNC: 1 MG/DL (ref 0.7–1.2)
DSDNA IGG SER QL IA: 1.2 IU/ML
EKG ATRIAL RATE: 71 BPM
EKG ATRIAL RATE: 74 BPM
EKG ATRIAL RATE: 78 BPM
EKG P AXIS: 71 DEGREES
EKG P AXIS: 73 DEGREES
EKG P AXIS: 78 DEGREES
EKG P-R INTERVAL: 170 MS
EKG P-R INTERVAL: 174 MS
EKG P-R INTERVAL: 176 MS
EKG Q-T INTERVAL: 400 MS
EKG Q-T INTERVAL: 406 MS
EKG Q-T INTERVAL: 422 MS
EKG QRS DURATION: 100 MS
EKG QRS DURATION: 102 MS
EKG QRS DURATION: 96 MS
EKG QTC CALCULATION (BAZETT): 441 MS
EKG QTC CALCULATION (BAZETT): 456 MS
EKG QTC CALCULATION (BAZETT): 468 MS
EKG R AXIS: 82 DEGREES
EKG R AXIS: 87 DEGREES
EKG R AXIS: 89 DEGREES
EKG T AXIS: 40 DEGREES
EKG T AXIS: 61 DEGREES
EKG T AXIS: 69 DEGREES
EKG VENTRICULAR RATE: 71 BPM
EKG VENTRICULAR RATE: 74 BPM
EKG VENTRICULAR RATE: 78 BPM
EOSINOPHIL # BLD: 0.1 K/UL (ref 0–0.4)
EOSINOPHILS RELATIVE PERCENT: 1 % (ref 0–4)
ERYTHROCYTE [DISTWIDTH] IN BLOOD BY AUTOMATED COUNT: 13 % (ref 11.5–14.9)
GFR SERPL CREATININE-BSD FRML MDRD: >90 ML/MIN/1.73M2
GLUCOSE SERPL-MCNC: 98 MG/DL (ref 70–99)
HCT VFR BLD AUTO: 39.2 % (ref 41–53)
HGB BLD-MCNC: 12.9 G/DL (ref 13.5–17.5)
LYMPHOCYTES NFR BLD: 2.7 K/UL (ref 1–4.8)
LYMPHOCYTES RELATIVE PERCENT: 35 % (ref 24–44)
MCH RBC QN AUTO: 31.3 PG (ref 26–34)
MCHC RBC AUTO-ENTMCNC: 32.8 G/DL (ref 31–37)
MCV RBC AUTO: 95.6 FL (ref 80–100)
MICROORGANISM SPEC CULT: NO GROWTH
MONOCYTES NFR BLD: 0.4 K/UL (ref 0.1–1.3)
MONOCYTES NFR BLD: 6 % (ref 1–7)
NEUTROPHILS NFR BLD: 57 % (ref 36–66)
NEUTS SEG NFR BLD: 4.5 K/UL (ref 1.3–9.1)
NUCLEAR IGG SER IA-RTO: 0.3 U/ML
PLATELET # BLD AUTO: 143 K/UL (ref 150–450)
PMV BLD AUTO: 9 FL (ref 6–12)
POTASSIUM SERPL-SCNC: 4.4 MMOL/L (ref 3.7–5.3)
RBC # BLD AUTO: 4.11 M/UL (ref 4.5–5.9)
SODIUM SERPL-SCNC: 141 MMOL/L (ref 135–144)
SPECIMEN DESCRIPTION: NORMAL
WBC OTHER # BLD: 7.8 K/UL (ref 3.5–11)

## 2024-04-30 PROCEDURE — 51798 US URINE CAPACITY MEASURE: CPT

## 2024-04-30 PROCEDURE — 85025 COMPLETE CBC W/AUTO DIFF WBC: CPT

## 2024-04-30 PROCEDURE — 80048 BASIC METABOLIC PNL TOTAL CA: CPT

## 2024-04-30 PROCEDURE — 6360000002 HC RX W HCPCS

## 2024-04-30 PROCEDURE — 99233 SBSQ HOSP IP/OBS HIGH 50: CPT | Performed by: SURGERY

## 2024-04-30 PROCEDURE — 2580000003 HC RX 258: Performed by: INTERNAL MEDICINE

## 2024-04-30 PROCEDURE — 93010 ELECTROCARDIOGRAM REPORT: CPT | Performed by: INTERNAL MEDICINE

## 2024-04-30 PROCEDURE — 36415 COLL VENOUS BLD VENIPUNCTURE: CPT

## 2024-04-30 PROCEDURE — 1200000000 HC SEMI PRIVATE

## 2024-04-30 PROCEDURE — 99232 SBSQ HOSP IP/OBS MODERATE 35: CPT | Performed by: INTERNAL MEDICINE

## 2024-04-30 PROCEDURE — 82550 ASSAY OF CK (CPK): CPT

## 2024-04-30 RX ORDER — SODIUM CHLORIDE 450 MG/100ML
INJECTION, SOLUTION INTRAVENOUS CONTINUOUS
Status: DISCONTINUED | OUTPATIENT
Start: 2024-04-30 | End: 2024-05-01 | Stop reason: HOSPADM

## 2024-04-30 RX ADMIN — SODIUM CHLORIDE: 4.5 INJECTION, SOLUTION INTRAVENOUS at 15:39

## 2024-04-30 RX ADMIN — ENOXAPARIN SODIUM 40 MG: 100 INJECTION SUBCUTANEOUS at 07:49

## 2024-04-30 RX ADMIN — SODIUM CHLORIDE: 9 INJECTION, SOLUTION INTRAVENOUS at 07:50

## 2024-04-30 ASSESSMENT — ENCOUNTER SYMPTOMS
WHEEZING: 0
VOMITING: 0
CHEST TIGHTNESS: 0
ABDOMINAL DISTENTION: 0
SHORTNESS OF BREATH: 0
DIARRHEA: 0
COUGH: 0
BLOOD IN STOOL: 0
STRIDOR: 0
CONSTIPATION: 0
NAUSEA: 0
ABDOMINAL PAIN: 0

## 2024-04-30 NOTE — PLAN OF CARE
Problem: Discharge Planning  Goal: Discharge to home or other facility with appropriate resources  4/30/2024 1304 by Linnea Espinoza, RN  Outcome: Progressing  Dc barrier: CK elevated, needing to plateau or trend down     Problem: Safety - Adult  Goal: Free from fall injury  4/30/2024 1304 by Linnea Espinoza, RN  Outcome: Progressing  Pt assessed as a fall risk this shift. Remains free from falls and accidental injury at this time. Fall precautions in place, including falling star sign and fall risk band on pt. Floor free from obstacles, and bed is locked and in lowest position. Adequate lighting provided.  Pt encouraged to call before getting OOB for any need.  Bed alarm activated. Will continue to monitor needs during hourly rounding, and reinforce education on use of call light.     Problem: Pain  Goal: Verbalizes/displays adequate comfort level or baseline comfort level  4/30/2024 1304 by Linnea Espinoza, RN  Outcome: Progressing  Pt medicated with pain medication prn.  Assessed all pain characteristics including level, type, location, frequency, and onset.  Non-pharmacologic interventions offered to pt as well.  Pt states pain is tolerable at this time.

## 2024-04-30 NOTE — PROGRESS NOTES
[facility]  Progress Note                   Date:   4/30/2024  Patient name: Irwin Elena  Date of admission:  4/28/2024  1:29 PM  MRN:   [id number]  YOB: 1990  PCP: No primary care provider on file.    Reason for Admission: Dehydration [E86.0]  EDEL (acute kidney injury) (HCC) [N17.9]  Syncope with normal neurologic examination [R55]    Subjective:       Clinical Changes /Abnormalities:Patient seen and examined. Denies chest pain or shortness of breath. Tele/vitals/labs reviewed .     Review of Systems    Medications:   Scheduled Meds:   sodium chloride flush  5-40 mL IntraVENous 2 times per day    enoxaparin  40 mg SubCUTAneous Daily     Continuous Infusions:   sodium chloride 125 mL/hr at 04/30/24 0750    sodium chloride       CBC:   Recent Labs     04/28/24  1415 04/29/24  0604 04/30/24  0554   WBC 21.8* 16.1* 7.8   HGB 14.2 13.1* 12.9*    152 143*     BMP:    Recent Labs     04/28/24  1415 04/29/24  0604 04/30/24  0554    140 141   K 4.7 5.0 4.4    109* 110*   CO2 23 23 24   BUN 20 14 10   CREATININE 2.3* 1.1 1.0   GLUCOSE 140* 103* 98     Hepatic:No results for input(s): \"AST\", \"ALT\", \"ALB\", \"BILITOT\", \"ALKPHOS\" in the last 72 hours.  Troponin:   Recent Labs     04/28/24  1415 04/28/24  1515 04/28/24  2032   TROPHS 86* 72* 37*     BNP: No results for input(s): \"BNP\" in the last 72 hours.  Lipids: No results for input(s): \"CHOL\", \"HDL\" in the last 72 hours.    Invalid input(s): \"LDLCALCU\"  INR:   Recent Labs     04/28/24  1415   INR 1.1     ECho Interpretation Summary 4/2024          Left Ventricle: Normal left ventricular systolic function with a visually estimated EF of 60 - 65%. Left ventricle size is normal. Normal wall thickness. Normal wall motion. Normal diastolic function.    No significant valvular regurgitation or stenosis seen on present study.     Echo Findings    Left Ventricle Normal left ventricular systolic function with a visually estimated

## 2024-04-30 NOTE — PROGRESS NOTES
Department of Internal Medicine  Nephrology Cherelle Martinez MD Progress Note    Reason for consultation: Management of acute kidney injury.    Consulting physician: Taryn Velazquez MD.    Interval history: Patient was seen and examined today and is nonoliguric.  He still has soreness in both legs but urine is kelby-colored.  CPK has not improved.  He does not have chest pain or shortness of breath.    History of present illness: This is a 33 y.o. male with no significant past medical history, who presented after episode of syncope which occurred after he finished a 26.2 mile marathon.  There was no associated weakness orseizures.  He is not on any medications. He apparently had diarrhea few days prior to the marathon. He however stayed hydrated and was drinking Gatorade.    Vital signs were stable at presentation with blood pressure 130/64 mmHg.  Chest x-ray was clear.  Laboratory studies were remarkable for elevated serum creatinine 2.3 mg/dL and  and hence nephrology consultation.  CPK level has jumped to 3770 today.  He is nonoliguric.    Scheduled Meds:   sodium chloride flush  5-40 mL IntraVENous 2 times per day    enoxaparin  40 mg SubCUTAneous Daily     Continuous Infusions:   sodium chloride 125 mL/hr at 24 0750    sodium chloride       PRN Meds:.sodium chloride flush, sodium chloride, potassium chloride **OR** potassium alternative oral replacement **OR** potassium chloride, magnesium sulfate, ondansetron **OR** ondansetron, melatonin, polyethylene glycol, acetaminophen **OR** acetaminophen    Physical Exam:    VITALS:  BP (!) 137/95   Pulse 55   Temp 97.4 °F (36.3 °C) (Oral)   Resp 17   Ht 1.778 m (5' 10\")   Wt 79.4 kg (175 lb)   SpO2 100%   BMI 25.11 kg/m²   TEMPERATURE:  Current - Temp: 97.4 °F (36.3 °C); Max - Temp  Av °F (36.7 °C)  Min: 97.4 °F (36.3 °C)  Max: 98.4 °F (36.9 °C)  RESPIRATIONS RANGE: Resp  Av.2  Min: 16  Max: 18  PULSE RANGE: Pulse  Av.2  Min:  10:18 PM     Urine Creatinine:     Lab Results   Component Value Date/Time    LABCREA 118.0 04/28/2024 08:55 PM     IMPRESSION/RECOMMENDATIONS:      1.  Acute kidney injury - top differentials are prerenal azotemia from dehydration as well as toxic acute tubular necrosis secondary to rhabdomyolysis.   Renal function has normalized.  There is no evidence at this point of ATN secondary to rhabdomyolysis but risk persists.  Plan:  Change IVF to 0.45 NS at 125 mL/h.  Check CPK daily.  Monitor urine output closely.  Basic metabolic profile daily    2.  Leukocytosis - likely reactive.  WBC is down to normal at 7.8K.    3.  Rhabdomyolysis - CPK increased slightly from 3772 to 4038 today.  Will continue aggressive IV fluid and keep patient in hospital for at least 1 more day.    Prognosis is guarded.    Cherelle Martinez MD FACP  Attending Nephrologist  4/30/2024 2:37 PM

## 2024-04-30 NOTE — PROGRESS NOTES
\"CULTURE\"    [unfilled]    Radiology:    XR CHEST PORTABLE    Result Date: 4/28/2024  EXAMINATION: ONE XRAY VIEW OF THE CHEST 4/28/2024 12:35 pm COMPARISON: None. HISTORY: ORDERING SYSTEM PROVIDED HISTORY: syncope TECHNOLOGIST PROVIDED HISTORY: syncope Reason for Exam: Pt states he was running a marathon today and his leg started feeling weird and he passed out after the race. No chest complaints FINDINGS: The lungs and costophrenic angles are clear.  The cardiomediastinal silhouette, pulmonary vessels and interstitium appear normal.     No radiographic evidence of an acute cardiopulmonary process.         Physical Examination:      /72   Pulse 62   Temp 98.2 °F (36.8 °C) (Oral)   Resp 16   Ht 1.778 m (5' 10\")   Wt 79.4 kg (175 lb)   SpO2 98%   BMI 25.11 kg/m²     Physical Exam  Vitals and nursing note reviewed.   Constitutional:       Appearance: Normal appearance.   HENT:      Head: Normocephalic and atraumatic.   Cardiovascular:      Rate and Rhythm: Normal rate and regular rhythm.      Pulses: Normal pulses.      Heart sounds: Normal heart sounds.   Pulmonary:      Effort: Pulmonary effort is normal.      Breath sounds: Normal breath sounds.   Abdominal:      General: Abdomen is flat. There is no distension.      Tenderness: There is no abdominal tenderness. There is no guarding.   Musculoskeletal:         General: Tenderness (mild tenderness throughout lower extremities) present. Normal range of motion.   Neurological:      Mental Status: He is alert and oriented to person, place, and time.         Assessment:        Primary Problem  Syncope    Active Hospital Problems    Diagnosis Date Noted    Dehydration [E86.0] 04/29/2024     Priority: High    Troponin level elevated [R79.89] 04/29/2024     Priority: High    Syncope [R55] 04/28/2024       Plan:        Patient status Admit as inpatient in the  Med/Surge     Cardiogenic syncope 2/2 hypovolemia due to viral gastroenteritis  -2 day hx of  diarrhea  -abdominal cramps  -ran in [employer] 4/[place]  -IV fluid resuscitation, 2 L bolus received in ED  -Continuous NS at 125 mL/h     EDEL 2/2 Dehydration induced Rhabdomyolysis  -Cr 2.3, baseline normal, >> 1.1 >> 1.0  -2 L NS bolus given in ED  -Urine studies normal  -Nephrology consulted  -CK up from 546 to 3770 to 4038  -Continue NS at 125 mL/h  -BMP daily  -CK daily     Elevated Troponin  -Likely Demand Ischemia 2/2 extreme exertion  -ST changes on EKG  -Cardiology consulted  -Trop 86, repeat 72, 2nd repeat 25  -Holter monitor planned at discharge     Leukocytosis 2/2 Stress Response, resolved  -WBC of 21.8, down to 16 >> 7.8  -CXR unremarkable  -UA just showed signs of dehydration  -Daily CBC        DVT prophylaxis: Lovenox 40 mg SC daily  GI prophylaxis: None  Diet: Regular adult diet  Disposition: Home     OT/PT/SW consults in place     Code Status: Full code      Matty Dixon MD  PGY1-Transitional Year Resident  4/30/2024  7:09 AM     This note is created with the assistance of the speech recognition program. While intending to generate a document that actually reflects the content of the visit, it can still have some errors including those of syntax and sound-a-like substitutions which may escape proof reading. Actual meaning can be extrapolated by contextual inference.    Attending Physician Statement  I have discussed the care of Irwin Elena and I have examined the patient myself and taken ROS and HPI, including pertinent history and exam findings, with the resident. I have reviewed the key elements of all parts of the encounter with the resident.  I agree with the assessment, plan and orders as documented by the resident.      Electronically signed by Travis Joiner MD

## 2024-04-30 NOTE — CARE COORDINATION
ONGOING DISCHARGE PLAN:    Patient is alert and oriented x4.    Spoke with patient regarding discharge plan and patient confirms that plan is still home without needs. Declined VNS.    Cre 1.0 today.    CK 4,038 (was 3,770 yesterday).    F/U PCP HALLIE Butts 5/8 @ 0830am.    Will continue to follow for additional discharge needs.    If patient is discharged prior to next notation, then this note serves as note for discharge by case management.    Electronically signed by Carleen Schumacher RN on 4/30/2024 at 10:50 AM

## 2024-05-01 ENCOUNTER — HOSPITAL ENCOUNTER (INPATIENT)
Age: 34
Discharge: HOME | End: 2024-05-03
Attending: INTERNAL MEDICINE
Payer: COMMERCIAL

## 2024-05-01 VITALS
RESPIRATION RATE: 16 BRPM | BODY MASS INDEX: 25.05 KG/M2 | SYSTOLIC BLOOD PRESSURE: 127 MMHG | DIASTOLIC BLOOD PRESSURE: 81 MMHG | OXYGEN SATURATION: 99 % | WEIGHT: 175 LBS | HEART RATE: 51 BPM | TEMPERATURE: 97.7 F | HEIGHT: 70 IN

## 2024-05-01 LAB
ANION GAP SERPL CALCULATED.3IONS-SCNC: 7 MMOL/L (ref 9–17)
BASOPHILS # BLD: 0 K/UL (ref 0–0.2)
BASOPHILS NFR BLD: 1 % (ref 0–2)
BUN SERPL-MCNC: 8 MG/DL (ref 6–20)
CALCIUM SERPL-MCNC: 8.9 MG/DL (ref 8.6–10.4)
CHLORIDE SERPL-SCNC: 109 MMOL/L (ref 98–107)
CK SERPL-CCNC: 2864 U/L (ref 39–308)
CO2 SERPL-SCNC: 26 MMOL/L (ref 20–31)
CREAT SERPL-MCNC: 0.9 MG/DL (ref 0.7–1.2)
ECHO BSA: 1.98 M2
EOSINOPHIL # BLD: 0.1 K/UL (ref 0–0.4)
EOSINOPHILS RELATIVE PERCENT: 2 % (ref 0–4)
ERYTHROCYTE [DISTWIDTH] IN BLOOD BY AUTOMATED COUNT: 13.2 % (ref 11.5–14.9)
GFR, ESTIMATED: >90 ML/MIN/1.73M2
GLUCOSE SERPL-MCNC: 103 MG/DL (ref 70–99)
HCT VFR BLD AUTO: 39 % (ref 41–53)
HGB BLD-MCNC: 12.8 G/DL (ref 13.5–17.5)
LYMPHOCYTES NFR BLD: 2.5 K/UL (ref 1–4.8)
LYMPHOCYTES RELATIVE PERCENT: 39 % (ref 24–44)
MCH RBC QN AUTO: 31.1 PG (ref 26–34)
MCHC RBC AUTO-ENTMCNC: 32.9 G/DL (ref 31–37)
MCV RBC AUTO: 94.6 FL (ref 80–100)
MONOCYTES NFR BLD: 0.4 K/UL (ref 0.1–1.3)
MONOCYTES NFR BLD: 6 % (ref 1–7)
NEUTROPHILS NFR BLD: 52 % (ref 36–66)
NEUTS SEG NFR BLD: 3.4 K/UL (ref 1.3–9.1)
PLATELET # BLD AUTO: 148 K/UL (ref 150–450)
PMV BLD AUTO: 8.3 FL (ref 6–12)
POTASSIUM SERPL-SCNC: 4.3 MMOL/L (ref 3.7–5.3)
RBC # BLD AUTO: 4.12 M/UL (ref 4.5–5.9)
SODIUM SERPL-SCNC: 142 MMOL/L (ref 135–144)
WBC OTHER # BLD: 6.5 K/UL (ref 3.5–11)

## 2024-05-01 PROCEDURE — 80048 BASIC METABOLIC PNL TOTAL CA: CPT

## 2024-05-01 PROCEDURE — 99239 HOSP IP/OBS DSCHRG MGMT >30: CPT | Performed by: INTERNAL MEDICINE

## 2024-05-01 PROCEDURE — 93225 XTRNL ECG REC<48 HRS REC: CPT

## 2024-05-01 PROCEDURE — 85025 COMPLETE CBC W/AUTO DIFF WBC: CPT

## 2024-05-01 PROCEDURE — 82550 ASSAY OF CK (CPK): CPT

## 2024-05-01 PROCEDURE — 36415 COLL VENOUS BLD VENIPUNCTURE: CPT

## 2024-05-01 NOTE — DISCHARGE INSTRUCTIONS
Will have muscle enzyme reading for the next 5 days  Follow with your PCP with 1 week  You will be discharge on Holter monitor per cardiology recommendation, follow with Dr. Jim within 1 week  Avoid excessive strenuous exercise, drink from 75 to 100 ounces of water daily  Return to the ED if your CK level trending up or your symptom worsen

## 2024-05-01 NOTE — PROGRESS NOTES
RN called cardiovascular. A holter monitor is available and will be brought up to patient as soon as they are available.

## 2024-05-01 NOTE — PLAN OF CARE
Problem: Discharge Planning  Goal: Discharge to home or other facility with appropriate resources  Outcome: Progressing  Flowsheets (Taken 5/1/2024 0512)  Discharge to home or other facility with appropriate resources:   Identify barriers to discharge with patient and caregiver   Arrange for needed discharge resources and transportation as appropriate   Identify discharge learning needs (meds, wound care, etc)   Refer to discharge planning if patient needs post-hospital services based on physician order or complex needs related to functional status, cognitive ability or social support system     Problem: Safety - Adult  Goal: Free from fall injury  Outcome: Progressing  Flowsheets (Taken 5/1/2024 0512)  Free From Fall Injury: Instruct family/caregiver on patient safety     Problem: Pain  Goal: Verbalizes/displays adequate comfort level or baseline comfort level  Outcome: Progressing  Flowsheets (Taken 5/1/2024 0512)  Verbalizes/displays adequate comfort level or baseline comfort level:   Encourage patient to monitor pain and request assistance   Assess pain using appropriate pain scale   Administer analgesics based on type and severity of pain and evaluate response   Implement non-pharmacological measures as appropriate and evaluate response   Consider cultural and social influences on pain and pain management   Notify Licensed Independent Practitioner if interventions unsuccessful or patient reports new pain     Problem: Skin/Tissue Integrity  Goal: Absence of new skin breakdown  Description: 1.  Monitor for areas of redness and/or skin breakdown  2.  Assess vascular access sites hourly  3.  Every 4-6 hours minimum:  Change oxygen saturation probe site  4.  Every 4-6 hours:  If on nasal continuous positive airway pressure, respiratory therapy assess nares and determine need for appliance change or resting period.  Outcome: Progressing  Note: Monitoring skin

## 2024-05-01 NOTE — PROGRESS NOTES
RN reviewed all discharge paperwork with family and patient. All questions and concerns were answered at this time. RN encouraged patient to attend follow-up appointments and schedule any appointments needed. Patient was very agreeable.

## 2024-05-01 NOTE — DISCHARGE SUMMARY
[facility]   IN-PATIENT SERVICE   [facility]    Discharge Summary     Patient ID: Irwin Elena  :  1990   MRN: [id number]     ACCOUNT:  [id number]   Patient's PCP: No primary care provider on file.  Admit Date: 2024   Discharge Date: 2024     Length of Stay: 3  Code Status:  Full Code  Admitting Physician: Taryn Velazquez MD  Discharge Physician: Blayne Jimenez MD     Active Discharge Diagnoses:       Primary Problem  Syncope      Hospital Problems  Active Hospital Problems    Diagnosis Date Noted    Dehydration [E86.0] 2024     Priority: High    Troponin level elevated [R79.89] 2024     Priority: High    Syncope [R55] 2024       Admission Condition:  poor     Discharged Condition: good    Hospital Stay:       Hospital Course:  Irwin Elena is a 33 y.o. male who was admitted for the management of   Syncope , presented to ER with   Patient was participating in [employer] , and after finishing, the patient experienced blurry vision, ears ringing, and had an episode of emesis.  About 1 hour after finishing, the patient was at home speaking with his sister when he slumped over and lost consciousness for approximately 15 seconds.  Patient regained consciousness without confusion.  There is no witnessed shaking, tongue bite, trauma, or incontinence.  Patient had associated muscle cramps with running, and 2-day history of diarrhea.  In the ED, EKG showed ST elevations in V2 to V5.  Troponin elevated 86, repeat 72.  Cardiology was consulted and did not recommend any acute interventions at this time.  Echo was done and was within normal, cardiologist recommended Holter monitor.  lab workup also notable for leukocytosis of 21.8, and EDEL with Cr of 2.3, baseline normal.  Likely due to dehydration, at the day of discharge white blood cell, renal function was within normal.  Creatinine kinase trending  from 546 to 3770 to 4038,today is

## 2024-05-01 NOTE — PROGRESS NOTES
Department of Internal Medicine  Nephrology Cherelle Martinez MD Progress Note    Reason for consultation: Management of acute kidney injury.    Consulting physician: Taryn Velazquez MD.    Interval history: Patient feels well today and is nonoliguric.  Urine is kelby-colored.  Leg weakness and tightness have improved significantly.  CPK level today is 2864 [was 4,036 yesterday].    History of present illness: This is a 33 y.o. male with no significant past medical history, who presented after episode of syncope which occurred after he finished a 26.2 mile marathon.  There was no associated weakness orseizures.  He is not on any medications. He apparently had diarrhea few days prior to the marathon. He however stayed hydrated and was drinking Gatorade.    Vital signs were stable at presentation with blood pressure 130/64 mmHg.  Chest x-ray was clear.  Laboratory studies were remarkable for elevated serum creatinine 2.3 mg/dL and  and hence nephrology consultation.  CPK level has jumped to 3770 today.  He is nonoliguric.    Scheduled Meds:   sodium chloride flush  5-40 mL IntraVENous 2 times per day    enoxaparin  40 mg SubCUTAneous Daily     Continuous Infusions:   sodium chloride 125 mL/hr at 24 1539    sodium chloride       PRN Meds:.sodium chloride flush, sodium chloride, potassium chloride **OR** potassium alternative oral replacement **OR** potassium chloride, magnesium sulfate, ondansetron **OR** ondansetron, melatonin, polyethylene glycol, acetaminophen **OR** acetaminophen    Physical Exam:    VITALS:  /81   Pulse 51   Temp 97.7 °F (36.5 °C)   Resp 16   Ht 1.778 m (5' 10\")   Wt 79.4 kg (175 lb)   SpO2 99%   BMI 25.11 kg/m²   TEMPERATURE:  Current - Temp: 97.7 °F (36.5 °C); Max - Temp  Av.9 °F (36.6 °C)  Min: 97.7 °F (36.5 °C)  Max: 98.1 °F (36.7 °C)  RESPIRATIONS RANGE: Resp  Av  Min: 16  Max: 18  PULSE RANGE: Pulse  Av  Min: 51  Max: 54  BLOOD PRESSURE RANGE:   azotemia from dehydration as well as toxic acute tubular necrosis secondary to rhabdomyolysis.   Renal function has normalized.  There is no evidence at this point of ATN secondary to rhabdomyolysis but risk persists.  Plan:  Continue IVF 0.45 NS at 125 mL/h.  Check CPK daily.  Monitor urine output closely.  Basic metabolic profile daily    2.  Rhabdomyolysis - CPK  trending down. Will continue aggressive IV fluid .    Prognosis is guarded.    Cherelle Martinez MD FACP  Attending Nephrologist  5/1/2024 11:54 AM

## 2024-05-01 NOTE — CARE COORDINATION
ONGOING DISCHARGE PLAN:     Patient is alert and oriented x4.     Spoke with patient regarding discharge plan and patient confirms that plan is still home without needs. Declined VNS.     Cr 0.9 today. CK 2,864 (was 4,038 yesterday).     F/U PCP HALLIE Butts 5/8 @ 0830am.     Will continue to follow for additional discharge needs.     If patient is discharged prior to next notation, then this note serves as note for discharge by case management.    Electronically signed by Heaven Galvan RN on 5/1/2024 at 1:05 PM

## 2024-05-01 NOTE — PROGRESS NOTES
[facility]  IN-PATIENT SERVICE  [facility]    PROGRESS NOTE             5/1/2024    8:51 AM    Name:   Irwin Elena  MRN:     [id number]     Acct:      [id number]   Room:   2056/2056-01   Day:  3  Admit Date:  4/28/2024  1:29 PM    PCP:  No primary care provider on file.  Code Status:  Full Code    Subjective:     C/C:   Chief Complaint   Patient presents with    Loss of Consciousness     Interval History Status: improved.    Patient seen and examined at bedside this morning, no acute event overnight, creatinine kinase trending down, kidney function within normal, likely discharge today    Brief History:       Irwin Elena is a 33 y.o. male without significant past medical history who presented after syncopal episode at home.  Patient was participating in [employer] this morning, and after finishing, the patient experienced blurry vision, ears ringing, and had an episode of emesis.  About 1 hour after finishing, the patient was at home speaking with his sister when he slumped over and lost consciousness for approximately 15 seconds.  Patient regained consciousness without confusion.  There is no witnessed shaking, tongue bite, trauma, or incontinence.  Patient had associated muscle cramps with running, and 2-day history of diarrhea. Patient denies any cough, congestion, headache, runny nose, or other signs of infection besides diarrhea and abdominal cramps. In the ED, EKG showed ST elevations in V2 to V5.  Troponin elevated 86, repeat 72.  Cardiology was consulted and did not recommend any acute interventions at this time.  Lab workup also notable for leukocytosis of 21.8, and EDEL with Cr of 2.3, baseline normal.     Review of Systems:     Review of Systems   Constitutional:  Negative for activity change, chills and fever.   Respiratory:  Negative for cough, chest tightness, shortness of breath, wheezing and stridor.    Cardiovascular:  Negative for  chest pain, palpitations and leg swelling.   Gastrointestinal:  Negative for abdominal distention, abdominal pain, blood in stool, constipation, diarrhea, nausea and vomiting.   Genitourinary:  Negative for difficulty urinating, dysuria, frequency and hematuria.   Musculoskeletal:  Positive for myalgias.   Neurological:  Negative for dizziness, light-headedness and headaches.          Medications:     Allergies:  No Known Allergies    Current Meds:   Scheduled Meds:    sodium chloride flush  5-40 mL IntraVENous 2 times per day    enoxaparin  40 mg SubCUTAneous Daily     Continuous Infusions:    sodium chloride 125 mL/hr at 24 1539    sodium chloride       PRN Meds: sodium chloride flush, sodium chloride, potassium chloride **OR** potassium alternative oral replacement **OR** potassium chloride, magnesium sulfate, ondansetron **OR** ondansetron, melatonin, polyethylene glycol, acetaminophen **OR** acetaminophen    Data:     Past Medical History:   has no past medical history on file.    Social History:   reports that he has never smoked. He does not have any smokeless tobacco history on file. He reports current alcohol use. He reports that he does not use drugs.     Family History: History reviewed. No pertinent family history.    Vitals:  /81   Pulse 51   Temp 97.7 °F (36.5 °C)   Resp 16   Ht 1.778 m (5' 10\")   Wt 79.4 kg (175 lb)   SpO2 99%   BMI 25.11 kg/m²   Temp (24hrs), Av.8 °F (36.6 °C), Min:97.4 °F (36.3 °C), Max:98.1 °F (36.7 °C)    No results for input(s): \"POCGLU\" in the last 72 hours.    I/O(24Hr):  No intake or output data in the 24 hours ending 24 0851    Labs:    [unfilled]    No results found for: \"SPECIAL\"  Lab Results   Component Value Date/Time    CULTURE NO GROWTH 2024 08:55 PM       [unfilled]    Radiology:    Echo (TTE) complete (PRN contrast/bubble/strain/3D)    Result Date: 2024    Left Ventricle: Normal left ventricular systolic function with a

## 2024-05-01 NOTE — PLAN OF CARE
Problem: Discharge Planning  Goal: Discharge to home or other facility with appropriate resources  5/1/2024 1303 by Mark Rodriguez, RN  Outcome: Adequate for Discharge  Flowsheets (Taken 5/1/2024 0810)  Discharge to home or other facility with appropriate resources:   Identify barriers to discharge with patient and caregiver   Arrange for needed discharge resources and transportation as appropriate  5/1/2024 0512 by Nuvia Ricardo, RN  Outcome: Progressing  Flowsheets (Taken 5/1/2024 0512)  Discharge to home or other facility with appropriate resources:   Identify barriers to discharge with patient and caregiver   Arrange for needed discharge resources and transportation as appropriate   Identify discharge learning needs (meds, wound care, etc)   Refer to discharge planning if patient needs post-hospital services based on physician order or complex needs related to functional status, cognitive ability or social support system     Problem: Safety - Adult  Goal: Free from fall injury  5/1/2024 1303 by Mark Rodriguez RN  Outcome: Adequate for Discharge  Flowsheets (Taken 5/1/2024 0905)  Free From Fall Injury: Instruct family/caregiver on patient safety  5/1/2024 0512 by Nuvia Ricardo, RN  Outcome: Progressing  Flowsheets (Taken 5/1/2024 0512)  Free From Fall Injury: Instruct family/caregiver on patient safety

## 2024-05-16 LAB — ECHO BSA: 1.98 M2
